# Patient Record
Sex: FEMALE | URBAN - METROPOLITAN AREA
[De-identification: names, ages, dates, MRNs, and addresses within clinical notes are randomized per-mention and may not be internally consistent; named-entity substitution may affect disease eponyms.]

---

## 2019-12-02 ENCOUNTER — OFFICE VISIT (OUTPATIENT)
Dept: URBAN - METROPOLITAN AREA CLINIC 48 | Facility: CLINIC | Age: 64
End: 2019-12-02
Payer: MEDICARE

## 2019-12-02 DIAGNOSIS — H11.002 PTERYGIUM OF LEFT EYE: ICD-10-CM

## 2019-12-02 DIAGNOSIS — H25.13 AGE-RELATED NUCLEAR CATARACT, BILATERAL: Primary | ICD-10-CM

## 2019-12-02 PROCEDURE — 99204 OFFICE O/P NEW MOD 45 MIN: CPT | Performed by: OPHTHALMOLOGY

## 2019-12-02 ASSESSMENT — KERATOMETRY
OS: 42.63
OD: 45.25

## 2019-12-02 ASSESSMENT — INTRAOCULAR PRESSURE
OD: 15
OS: 15

## 2019-12-02 ASSESSMENT — VISUAL ACUITY
OS: 20/30
OD: 20/20

## 2019-12-02 NOTE — IMPRESSION/PLAN
Impression: Pterygium of left eye: H11.002. Plan: Discussed diagnosis in detail with patient. Discussed treatment options with patient.

## 2019-12-10 ENCOUNTER — OFFICE VISIT (OUTPATIENT)
Dept: URBAN - METROPOLITAN AREA CLINIC 48 | Facility: CLINIC | Age: 64
End: 2019-12-10
Payer: MEDICARE

## 2019-12-10 PROCEDURE — 92025 CPTRIZED CORNEAL TOPOGRAPHY: CPT | Performed by: OPHTHALMOLOGY

## 2019-12-10 PROCEDURE — 92012 INTRM OPH EXAM EST PATIENT: CPT | Performed by: OPHTHALMOLOGY

## 2019-12-10 NOTE — IMPRESSION/PLAN
Impression: Pterygium of left eye: H11.002. 3.1mm Plan: 

Schedule Pterygium Excision OS with Graft. Discussed diagnosis in detail with patient. Advised patient of condition. Risks, benefits, alternatives and expectations of Pterygium surgery discussed. The patient desires Pterygium surgery in the  eye. 

schedule Pterygium surgery left eye  , RL 2, yaz C and graft.

## 2020-05-15 ENCOUNTER — OFFICE VISIT (OUTPATIENT)
Dept: URBAN - METROPOLITAN AREA CLINIC 48 | Facility: CLINIC | Age: 65
End: 2020-05-15
Payer: MEDICARE

## 2020-05-15 PROCEDURE — 92014 COMPRE OPH EXAM EST PT 1/>: CPT | Performed by: OPHTHALMOLOGY

## 2020-05-15 ASSESSMENT — INTRAOCULAR PRESSURE
OD: 14
OS: 16

## 2020-05-15 NOTE — IMPRESSION/PLAN
Impression: Pterygium of left eye: H11.002. Plan: Discussed diagnosis in detail with patient. Advised patient of condition. Risks, benefits, alternatives and expectations of pterygium surgery discussed. The patient desires pterygium surgery in the left  eye. 

schedule pterygium surgery left  , RL2, yaz C  for  30 seconds and graft.

## 2020-05-15 NOTE — IMPRESSION/PLAN
Impression: Age-related nuclear cataract, bilateral: H25.13.  Plan: RTC  6-9 months cat eval with Dr. Aries Puentes

## 2020-06-11 ENCOUNTER — SURGERY (OUTPATIENT)
Dept: URBAN - METROPOLITAN AREA SURGERY 26 | Facility: SURGERY | Age: 65
End: 2020-06-11
Payer: MEDICARE

## 2020-06-11 PROCEDURE — 65426 REMOVAL OF EYE LESION: CPT | Performed by: OPHTHALMOLOGY

## 2020-06-12 ENCOUNTER — POST-OPERATIVE VISIT (OUTPATIENT)
Dept: URBAN - METROPOLITAN AREA CLINIC 48 | Facility: CLINIC | Age: 65
End: 2020-06-12

## 2020-06-12 PROCEDURE — 99024 POSTOP FOLLOW-UP VISIT: CPT | Performed by: OPHTHALMOLOGY

## 2020-06-12 RX ORDER — PREDNISOLONE ACETATE 10 MG/ML
1 % SUSPENSION/ DROPS OPHTHALMIC
Qty: 1 | Refills: 0 | Status: INACTIVE
Start: 2020-06-12 | End: 2020-06-18

## 2020-06-12 RX ORDER — NEOMYCIN, POLYMYXIN B SULFATES, DEXAMETHASONE 1; 3.5; 1 MG/G; MG/G; [USP'U]/G
OINTMENT OPHTHALMIC
Qty: 1 | Refills: 1 | Status: INACTIVE
Start: 2020-06-12 | End: 2020-06-12

## 2020-06-12 ASSESSMENT — INTRAOCULAR PRESSURE: OS: 19

## 2020-06-18 ENCOUNTER — POST-OPERATIVE VISIT (OUTPATIENT)
Dept: URBAN - METROPOLITAN AREA CLINIC 48 | Facility: CLINIC | Age: 65
End: 2020-06-18

## 2020-06-18 PROCEDURE — 99024 POSTOP FOLLOW-UP VISIT: CPT | Performed by: OPHTHALMOLOGY

## 2020-06-18 RX ORDER — PREDNISOLONE ACETATE 10 MG/ML
1 % SUSPENSION/ DROPS OPHTHALMIC
Qty: 1 | Refills: 0 | Status: INACTIVE
Start: 2020-06-18 | End: 2020-06-29

## 2020-06-18 ASSESSMENT — INTRAOCULAR PRESSURE: OS: 19

## 2020-06-29 ENCOUNTER — POST-OPERATIVE VISIT (OUTPATIENT)
Dept: URBAN - METROPOLITAN AREA CLINIC 48 | Facility: CLINIC | Age: 65
End: 2020-06-29

## 2020-06-29 PROCEDURE — 99024 POSTOP FOLLOW-UP VISIT: CPT | Performed by: OPTOMETRIST

## 2020-06-29 RX ORDER — PREDNISOLONE ACETATE 10 MG/ML
1 % SUSPENSION/ DROPS OPHTHALMIC
Qty: 1 | Refills: 0 | Status: INACTIVE
Start: 2020-06-29 | End: 2020-09-01

## 2020-06-29 ASSESSMENT — INTRAOCULAR PRESSURE
OS: 14
OD: 19

## 2020-07-23 ENCOUNTER — POST-OPERATIVE VISIT (OUTPATIENT)
Dept: URBAN - METROPOLITAN AREA CLINIC 48 | Facility: CLINIC | Age: 65
End: 2020-07-23

## 2020-07-23 DIAGNOSIS — Z48.810 ENCNTR FOR SURGICAL AFTCR FOL SURGERY ON THE SENSE ORGANS: ICD-10-CM

## 2020-07-23 PROCEDURE — 99024 POSTOP FOLLOW-UP VISIT: CPT | Performed by: OPHTHALMOLOGY

## 2020-07-23 ASSESSMENT — INTRAOCULAR PRESSURE: OS: 17

## 2020-08-07 ENCOUNTER — POST-OPERATIVE VISIT (OUTPATIENT)
Dept: URBAN - METROPOLITAN AREA CLINIC 48 | Facility: CLINIC | Age: 65
End: 2020-08-07

## 2020-08-07 DIAGNOSIS — Z09 ENCNTR FOR F/U EXAM AFT TRTMT FOR COND OTH THAN MALIG NEOPLM: Primary | ICD-10-CM

## 2020-08-07 PROCEDURE — 99024 POSTOP FOLLOW-UP VISIT: CPT | Performed by: OPHTHALMOLOGY

## 2020-08-07 RX ORDER — BRIMONIDINE TARTRATE 2 MG/ML
0.2 % SOLUTION/ DROPS OPHTHALMIC
Qty: 5 | Refills: 1 | Status: ACTIVE
Start: 2020-08-07

## 2020-08-07 ASSESSMENT — INTRAOCULAR PRESSURE
OS: 26
OD: 20

## 2020-08-14 ENCOUNTER — POST-OPERATIVE VISIT (OUTPATIENT)
Dept: URBAN - METROPOLITAN AREA CLINIC 48 | Facility: CLINIC | Age: 65
End: 2020-08-14
Payer: MEDICARE

## 2020-08-14 PROCEDURE — 99024 POSTOP FOLLOW-UP VISIT: CPT | Performed by: OPHTHALMOLOGY

## 2020-08-14 ASSESSMENT — INTRAOCULAR PRESSURE
OS: 14
OD: 12

## 2020-09-04 ENCOUNTER — POST-OPERATIVE VISIT (OUTPATIENT)
Dept: URBAN - METROPOLITAN AREA CLINIC 48 | Facility: CLINIC | Age: 65
End: 2020-09-04
Payer: MEDICARE

## 2020-09-04 PROCEDURE — 92012 INTRM OPH EXAM EST PATIENT: CPT | Performed by: OPHTHALMOLOGY

## 2020-09-04 PROCEDURE — 99024 POSTOP FOLLOW-UP VISIT: CPT | Performed by: OPHTHALMOLOGY

## 2020-09-04 ASSESSMENT — INTRAOCULAR PRESSURE
OS: 9
OD: 10

## 2020-09-04 NOTE — IMPRESSION/PLAN
Impression: Pterygium of left eye: H11.002. Plan: Patient to use PFbid x 2 days then stop. Patient reported head ache, if there  is headache more than 10/10 then patient needs to go to the ER or PCP. RTC 4 month cat jorge ( long exam)

## 2020-10-19 ENCOUNTER — OFFICE VISIT (OUTPATIENT)
Dept: URBAN - METROPOLITAN AREA CLINIC 48 | Facility: CLINIC | Age: 65
End: 2020-10-19
Payer: MEDICARE

## 2020-10-19 PROCEDURE — 92014 COMPRE OPH EXAM EST PT 1/>: CPT | Performed by: OPHTHALMOLOGY

## 2020-10-19 ASSESSMENT — KERATOMETRY
OS: 43.13
OD: 45.13

## 2020-10-19 ASSESSMENT — INTRAOCULAR PRESSURE
OD: 12
OS: 14

## 2020-10-19 ASSESSMENT — VISUAL ACUITY
OS: 20/40
OD: 20/25

## 2020-10-19 NOTE — IMPRESSION/PLAN
Impression: Age-related nuclear cataract, bilateral: H25.13. Plan: Discussed and reviewed diagnosis with patient, understood by patient. Patient had recent Pterygium surgery so rx for OU has changed, Cataract is not visually significant to patient, will continue to monitor. Patient elects to get new refraction.

## 2022-10-13 ENCOUNTER — OFFICE VISIT (OUTPATIENT)
Dept: URBAN - METROPOLITAN AREA CLINIC 48 | Facility: CLINIC | Age: 67
End: 2022-10-13
Payer: MEDICARE

## 2022-10-13 DIAGNOSIS — H25.13 AGE-RELATED NUCLEAR CATARACT, BILATERAL: Primary | ICD-10-CM

## 2022-10-13 DIAGNOSIS — H52.223 REGULAR ASTIGMATISM, BILATERAL: ICD-10-CM

## 2022-10-13 PROCEDURE — 99214 OFFICE O/P EST MOD 30 MIN: CPT | Performed by: OPHTHALMOLOGY

## 2022-10-13 ASSESSMENT — VISUAL ACUITY
OD: 20/25
OS: 20/25

## 2022-10-13 ASSESSMENT — KERATOMETRY
OS: 42.75
OD: 45.38

## 2022-10-13 ASSESSMENT — INTRAOCULAR PRESSURE
OS: 14
OD: 10

## 2022-10-13 NOTE — IMPRESSION/PLAN
Impression: Age-related nuclear cataract, bilateral: H25.13. Plan: Discussed and reviewed diagnosis with patient, understood by patient. Cataract is not visually significant to patient, will continue to monitor. Patient elects to get new refraction.